# Patient Record
Sex: FEMALE | URBAN - METROPOLITAN AREA
[De-identification: names, ages, dates, MRNs, and addresses within clinical notes are randomized per-mention and may not be internally consistent; named-entity substitution may affect disease eponyms.]

---

## 2024-08-08 ENCOUNTER — OFFICE VISIT (OUTPATIENT)
Dept: FAMILY MEDICINE CLINIC | Age: 39
End: 2024-08-08
Payer: COMMERCIAL

## 2024-08-08 VITALS
HEIGHT: 66 IN | WEIGHT: 244.2 LBS | DIASTOLIC BLOOD PRESSURE: 80 MMHG | SYSTOLIC BLOOD PRESSURE: 120 MMHG | TEMPERATURE: 97.6 F | BODY MASS INDEX: 39.25 KG/M2 | OXYGEN SATURATION: 97 % | HEART RATE: 76 BPM | RESPIRATION RATE: 16 BRPM

## 2024-08-08 DIAGNOSIS — Z00.00 ENCOUNTER FOR WELL ADULT EXAM WITHOUT ABNORMAL FINDINGS: Primary | ICD-10-CM

## 2024-08-08 DIAGNOSIS — E61.1 LOW SERUM IRON: ICD-10-CM

## 2024-08-08 DIAGNOSIS — E55.9 VITAMIN D DEFICIENCY: ICD-10-CM

## 2024-08-08 DIAGNOSIS — E78.00 ELEVATED LDL CHOLESTEROL LEVEL: ICD-10-CM

## 2024-08-08 PROCEDURE — 99395 PREV VISIT EST AGE 18-39: CPT | Performed by: NURSE PRACTITIONER

## 2024-08-08 RX ORDER — METHYLDOPA 500 MG
160 TABLET ORAL 2 TIMES DAILY
Qty: 90 TABLET | Refills: 2 | Status: SHIPPED | OUTPATIENT
Start: 2024-08-08

## 2024-08-08 ASSESSMENT — ANXIETY QUESTIONNAIRES
IF YOU CHECKED OFF ANY PROBLEMS ON THIS QUESTIONNAIRE, HOW DIFFICULT HAVE THESE PROBLEMS MADE IT FOR YOU TO DO YOUR WORK, TAKE CARE OF THINGS AT HOME, OR GET ALONG WITH OTHER PEOPLE: NOT DIFFICULT AT ALL
7. FEELING AFRAID AS IF SOMETHING AWFUL MIGHT HAPPEN: NOT AT ALL
GAD7 TOTAL SCORE: 3
5. BEING SO RESTLESS THAT IT IS HARD TO SIT STILL: NOT AT ALL
6. BECOMING EASILY ANNOYED OR IRRITABLE: SEVERAL DAYS
4. TROUBLE RELAXING: NOT AT ALL
2. NOT BEING ABLE TO STOP OR CONTROL WORRYING: SEVERAL DAYS
3. WORRYING TOO MUCH ABOUT DIFFERENT THINGS: NOT AT ALL
1. FEELING NERVOUS, ANXIOUS, OR ON EDGE: SEVERAL DAYS

## 2024-08-08 ASSESSMENT — PATIENT HEALTH QUESTIONNAIRE - PHQ9
SUM OF ALL RESPONSES TO PHQ QUESTIONS 1-9: 1
SUM OF ALL RESPONSES TO PHQ QUESTIONS 1-9: 1
SUM OF ALL RESPONSES TO PHQ9 QUESTIONS 1 & 2: 1
SUM OF ALL RESPONSES TO PHQ QUESTIONS 1-9: 1
SUM OF ALL RESPONSES TO PHQ QUESTIONS 1-9: 1
1. LITTLE INTEREST OR PLEASURE IN DOING THINGS: SEVERAL DAYS
2. FEELING DOWN, DEPRESSED OR HOPELESS: NOT AT ALL

## 2024-08-08 ASSESSMENT — ENCOUNTER SYMPTOMS
CONSTIPATION: 0
RESPIRATORY NEGATIVE: 1
DIARRHEA: 0
GASTROINTESTINAL NEGATIVE: 1
VOMITING: 0
SHORTNESS OF BREATH: 0
ABDOMINAL PAIN: 0
NAUSEA: 0
EYES NEGATIVE: 1
COUGH: 0

## 2024-08-08 NOTE — PATIENT INSTRUCTIONS
Thank you for choosing [facility].  We know you have options when it comes to your healthcare; we appreciate that you chose us. Our goal is to provide exceptional  service and world class care to every patient.  You will be receiving a survey via email or text message asking for your feedback.  Please take a few minutes to share your thoughts about your recent visit. Your comments helps us understand what we do well and ways we can improve.  Thank you in advance for your valuable feedback.              New Updates for [facility]    Thank you for choosing [facility] to give you the best care! [facility] is always trying to think of new ways to help their patients. We are asking all patients to try out the new digital registration that is now available through the [facility]. Down load today!. Via the willy you're now able to update your personal and registration information prior to your upcoming appointment. This will save you time once you arrive at the office to check-in, not to mention your information remains safe!! Many other perks come from signing up for an account, such as:  Requesting refills  Scheduling an appointment  Completing an E-Visit  Sending a message to the office/provider  Having access to your medication list  Paying your bill/copay prior to your appointment  Scheduling your yearly mammogram  Review your test results    If you are not familiar the [facility], please ask one of us and we will be happy to answer any questions or help you set-up your account.             Well Visit, Ages 18 to 65: Care Instructions  Well visits can help you stay healthy. Your doctor has checked your overall health and may have suggested ways to take good care of yourself. Your doctor also may have recommended tests. You can help prevent illness with healthy eating, good sleep, vaccinations, regular exercise, and other steps.    Get the tests that you and your doctor

## 2024-08-08 NOTE — PROGRESS NOTES
by mouth daily    ferrous sulfate dried (SLOW RELEASE IRON) 160 (50 Fe) MG TBCR extended release tablet 90 tablet 2     Sig: Take 160 mg by mouth 2 times daily       All patient questions answered.  Patient voiced understanding.    Quality Measures    Body mass index is 39.78 kg/m². Elevated. Weight control planned discussed Healthy diet and regular exercise.    BP: 120/80 Blood pressure is normal. Treatment plan consists of No treatment change needed.    No results found for: \"LDLDIRECT\" (goal LDL reduction with dx if diabetes is 50% LDL reduction)          8/8/2024     8:43 AM 7/15/2024     8:53 AM   PHQ Scores   PHQ2 Score 1 0   PHQ9 Score 1 4     Interpretation of Total Score Depression Severity: 1-4 = Minimal depression, 5-9 = Mild depression, 10-14 = Moderate depression, 15-19 = Moderately severe depression, 20-27 = Severe depression     Return in about 6 months (around 2/8/2025) for Anxiety.    (Please note that portions of this note were completed with a voice recognition program. Efforts were made to edit the dictations but occasionally words are mis-transcribed.)      Electronically signed by PAT Hylton CNP on 8/8/24 at 8:43 AM [facility]UST[facility]

## 2025-02-05 SDOH — ECONOMIC STABILITY: FOOD INSECURITY: WITHIN THE PAST 12 MONTHS, YOU WORRIED THAT YOUR FOOD WOULD RUN OUT BEFORE YOU GOT MONEY TO BUY MORE.: NEVER TRUE

## 2025-02-05 SDOH — ECONOMIC STABILITY: INCOME INSECURITY: IN THE LAST 12 MONTHS, WAS THERE A TIME WHEN YOU WERE NOT ABLE TO PAY THE MORTGAGE OR RENT ON TIME?: NO

## 2025-02-05 SDOH — ECONOMIC STABILITY: TRANSPORTATION INSECURITY
IN THE PAST 12 MONTHS, HAS THE LACK OF TRANSPORTATION KEPT YOU FROM MEDICAL APPOINTMENTS OR FROM GETTING MEDICATIONS?: NO

## 2025-02-05 SDOH — ECONOMIC STABILITY: FOOD INSECURITY: WITHIN THE PAST 12 MONTHS, THE FOOD YOU BOUGHT JUST DIDN'T LAST AND YOU DIDN'T HAVE MONEY TO GET MORE.: NEVER TRUE

## 2025-02-05 SDOH — ECONOMIC STABILITY: TRANSPORTATION INSECURITY
IN THE PAST 12 MONTHS, HAS LACK OF TRANSPORTATION KEPT YOU FROM MEETINGS, WORK, OR FROM GETTING THINGS NEEDED FOR DAILY LIVING?: NO

## 2025-02-06 ENCOUNTER — TELEMEDICINE (OUTPATIENT)
Dept: FAMILY MEDICINE CLINIC | Age: 40
End: 2025-02-06
Payer: COMMERCIAL

## 2025-02-06 DIAGNOSIS — Z13.228 SCREENING FOR ENDOCRINE, NUTRITIONAL, METABOLIC AND IMMUNITY DISORDER: ICD-10-CM

## 2025-02-06 DIAGNOSIS — E61.1 LOW SERUM IRON: ICD-10-CM

## 2025-02-06 DIAGNOSIS — Z13.29 SCREENING FOR ENDOCRINE, NUTRITIONAL, METABOLIC AND IMMUNITY DISORDER: ICD-10-CM

## 2025-02-06 DIAGNOSIS — Z13.21 SCREENING FOR ENDOCRINE, NUTRITIONAL, METABOLIC AND IMMUNITY DISORDER: ICD-10-CM

## 2025-02-06 DIAGNOSIS — Z13.220 LIPID SCREENING: ICD-10-CM

## 2025-02-06 DIAGNOSIS — R63.5 UNINTENDED WEIGHT GAIN: ICD-10-CM

## 2025-02-06 DIAGNOSIS — R53.83 OTHER FATIGUE: Primary | ICD-10-CM

## 2025-02-06 DIAGNOSIS — E55.9 VITAMIN D DEFICIENCY: ICD-10-CM

## 2025-02-06 DIAGNOSIS — Z83.3 FAMILY HISTORY OF DIABETES MELLITUS: ICD-10-CM

## 2025-02-06 DIAGNOSIS — Z13.0 SCREENING FOR ENDOCRINE, NUTRITIONAL, METABOLIC AND IMMUNITY DISORDER: ICD-10-CM

## 2025-02-06 PROCEDURE — 99213 OFFICE O/P EST LOW 20 MIN: CPT | Performed by: NURSE PRACTITIONER

## 2025-02-06 PROCEDURE — G8427 DOCREV CUR MEDS BY ELIG CLIN: HCPCS | Performed by: NURSE PRACTITIONER

## 2025-02-06 RX ORDER — METHYLDOPA 500 MG
160 TABLET ORAL 2 TIMES DAILY
Qty: 90 TABLET | Refills: 2 | Status: SHIPPED | OUTPATIENT
Start: 2025-02-06

## 2025-02-06 ASSESSMENT — ENCOUNTER SYMPTOMS
CONSTIPATION: 0
EYES NEGATIVE: 1
RESPIRATORY NEGATIVE: 1
GASTROINTESTINAL NEGATIVE: 1
ABDOMINAL PAIN: 0
DIARRHEA: 0
VOMITING: 0
NAUSEA: 0
SHORTNESS OF BREATH: 0
COUGH: 0

## 2025-02-06 ASSESSMENT — PATIENT HEALTH QUESTIONNAIRE - PHQ9
2. FEELING DOWN, DEPRESSED OR HOPELESS: NOT AT ALL
SUM OF ALL RESPONSES TO PHQ QUESTIONS 1-9: 0
1. LITTLE INTEREST OR PLEASURE IN DOING THINGS: NOT AT ALL
SUM OF ALL RESPONSES TO PHQ9 QUESTIONS 1 & 2: 0
SUM OF ALL RESPONSES TO PHQ QUESTIONS 1-9: 0

## 2025-02-06 NOTE — PATIENT INSTRUCTIONS
Thank you for choosing [facility].  We know you have options when it comes to your healthcare; we appreciate that you chose us. Our goal is to provide exceptional  service and world class care to every patient.  You will be receiving a survey via email or text message asking for your feedback.  Please take a few minutes to share your thoughts about your recent visit. Your comments helps us understand what we do well and ways we can improve.  Thank you in advance for your valuable feedback.            New Updates for [facility]    Thank you for choosing [facility] to give you the best care! [facility] is always trying to think of new ways to help their patients. We are asking all patients to try out the new digital registration that is now available through the [facility]. Down load today!. Via the willy you're now able to update your personal and registration information prior to your upcoming appointment. This will save you time once you arrive at the office to check-in, not to mention your information remains safe!! Many other perks come from signing up for an account, such as:  Requesting refills  Scheduling an appointment  Completing an E-Visit  Sending a message to the office/provider  Having access to your medication list  Paying your bill/copay prior to your appointment  Scheduling your yearly mammogram  Review your test results    If you are not familiar the [facility], please ask one of us and we will be happy to answer any questions or help you set-up your account.

## 2025-02-06 NOTE — PROGRESS NOTES
[facility]  [facility] MEDICINE  [address]  [address]  [place] [address]  Dept: [phone]    2025    TELEHEALTH EVALUATION -- Audio/Visual   Fareedpedro luis Alvarez (:  1985) has requested an audio/video evaluation for the following concern(s):    HPI:  Appointment for anxiety follow-up.    Specialists:      OBGYN- Denisse Martinez CNP., needs to reschedule apt to establish   Endocrinology- Referral for Dr. De Luna, but nothing scheduled.     Anxiety- Doing well without medication or counselor, but she is considering starting to go to therapy again. Going to the gym a few times per week, setting boundaries and self care in general.    Iron deficiency- she hasn't been taking iron. She is going to get it started again.     Vitamin D deficiency- Taking Vitamin D as prescribed. Overall she does feel better with the vitamin D.    Fatigue/Weight gain. Referral provided to endocrinology in July at NP appointment, but she never heard back from their office.        There were no vitals filed for this visit.        2025     9:34 AM   Patient-Reported Vitals   Patient-Reported Weight 239   Patient-Reported Height 5'4   Patient-Reported Temperature 98.2         Wt Readings from Last 3 Encounters:   24 110.8 kg (244 lb 3.2 oz)   07/15/24 110.5 kg (243 lb 9.6 oz)     BP Readings from Last 3 Encounters:   24 120/80   07/15/24 120/82       REVIEW OF SYSTEMS:   Review of Systems   Constitutional:  Positive for activity change, fatigue and unexpected weight change. Negative for chills and fever.   Eyes: Negative.  Negative for visual disturbance.   Respiratory: Negative.  Negative for cough and shortness of breath.    Cardiovascular: Negative.  Negative for chest pain and palpitations.   Gastrointestinal: Negative.  Negative for abdominal pain, constipation, diarrhea, nausea and vomiting.   Neurological: Negative.  Negative for dizziness and headaches.

## 2025-02-21 ENCOUNTER — PATIENT MESSAGE (OUTPATIENT)
Dept: FAMILY MEDICINE CLINIC | Age: 40
End: 2025-02-21

## 2025-05-02 NOTE — PROGRESS NOTES
[facility]  [facility]  [address]  [address]  [place] [address]  Dept: [phone]      25    Francisco Alvarez       Gyn Annual Exam      CHIEF COMPLAINT:    Chief Complaint   Patient presents with    [facility] Care    Annual Exam     Last pap 19 WNL HPV-                 Blood pressure 137/83, height 1.664 m (5' 5.5\"), weight 112.7 kg (248 lb 6.4 oz), last menstrual period 2025.     HPI :   Francisco Alvarez 1985 is a 39 y.o.   who is here for her annual exam.   Last pap 2019 NILM neg hpv  Decreased libido, decline in memory and is asking about perimenopause    Periods are monthly, occurring every 22-24 days and lasting 5 days.   HMB- no  Dysmenorrhea- occasionally    Moved here from [place].  Kids   does national certification for teachers  _____________________________________________________________________  Past Medical History:   Diagnosis Date    Abnormal Pap smear of cervix     unknown details, LEEP was WNL    Anxiety                                                                    Past Surgical History:   Procedure Laterality Date    CERVICAL BIOPSY  W/ LOOP ELECTRODE EXCISION      abnormal pap, testing came back benign     Family History   Problem Relation Age of Onset    Hypertension Mother     No Known Problems Father         unknown health hx    Anxiety Disorder Sister     Arthritis Maternal Grandmother     Hypertension Maternal Grandmother     Gout Maternal Grandmother     Glaucoma Maternal Grandmother     Kidney Disease Maternal Grandmother         no HD at this time    No Known Problems Maternal Grandfather     No Known Problems Paternal Grandmother     Diabetes Paternal Grandfather     Hypertension Paternal Grandfather     Prostate Cancer Paternal Grandfather     Prostate Cancer Maternal Great Grandfather     Breast Cancer Maternal Aunt         40's maternal great aunt

## 2025-05-05 ENCOUNTER — HOSPITAL ENCOUNTER (OUTPATIENT)
Age: 40
Setting detail: SPECIMEN
Discharge: HOME | End: 2025-05-05

## 2025-05-05 ENCOUNTER — OFFICE VISIT (OUTPATIENT)
Dept: OBGYN CLINIC | Age: 40
End: 2025-05-05
Payer: COMMERCIAL

## 2025-05-05 VITALS
HEIGHT: 66 IN | BODY MASS INDEX: 39.92 KG/M2 | DIASTOLIC BLOOD PRESSURE: 83 MMHG | WEIGHT: 248.4 LBS | SYSTOLIC BLOOD PRESSURE: 137 MMHG

## 2025-05-05 DIAGNOSIS — Z01.419 ENCOUNTER FOR GYNECOLOGICAL EXAMINATION: Primary | ICD-10-CM

## 2025-05-05 DIAGNOSIS — Z12.31 ENCOUNTER FOR SCREENING MAMMOGRAM FOR BREAST CANCER: ICD-10-CM

## 2025-05-05 PROCEDURE — 99385 PREV VISIT NEW AGE 18-39: CPT | Performed by: NURSE PRACTITIONER

## 2025-05-05 ASSESSMENT — ENCOUNTER SYMPTOMS
SHORTNESS OF BREATH: 0
BACK PAIN: 0
COUGH: 0
ABDOMINAL DISTENTION: 0
RESPIRATORY NEGATIVE: 1
GASTROINTESTINAL NEGATIVE: 1
ALLERGIC/IMMUNOLOGIC NEGATIVE: 1

## 2025-05-06 ENCOUNTER — RESULTS FOLLOW-UP (OUTPATIENT)
Dept: OBGYN CLINIC | Age: 40
End: 2025-05-06

## 2025-05-06 LAB
HPV I/H RISK 4 DNA CVX QL NAA+PROBE: NOT DETECTED
HPV SAMPLE: NORMAL
HPV, INTERPRETATION: NORMAL
HPV16 DNA CVX QL NAA+PROBE: NOT DETECTED
HPV18 DNA CVX QL NAA+PROBE: NOT DETECTED
SPECIMEN DESCRIPTION: NORMAL

## 2025-05-15 LAB — CYTOLOGY REPORT: NORMAL

## 2025-08-15 ENCOUNTER — OFFICE VISIT (OUTPATIENT)
Dept: FAMILY MEDICINE CLINIC | Age: 40
End: 2025-08-15
Payer: COMMERCIAL

## 2025-08-15 VITALS
HEIGHT: 66 IN | HEART RATE: 82 BPM | DIASTOLIC BLOOD PRESSURE: 82 MMHG | RESPIRATION RATE: 15 BRPM | TEMPERATURE: 97.8 F | SYSTOLIC BLOOD PRESSURE: 134 MMHG | BODY MASS INDEX: 39.57 KG/M2 | OXYGEN SATURATION: 99 % | WEIGHT: 246.2 LBS

## 2025-08-15 DIAGNOSIS — K59.09 OTHER CONSTIPATION: ICD-10-CM

## 2025-08-15 DIAGNOSIS — R53.83 OTHER FATIGUE: ICD-10-CM

## 2025-08-15 DIAGNOSIS — F41.9 ANXIETY: ICD-10-CM

## 2025-08-15 DIAGNOSIS — Z83.3 FAMILY HISTORY OF DIABETES MELLITUS: ICD-10-CM

## 2025-08-15 DIAGNOSIS — Z00.00 ENCOUNTER FOR WELL ADULT EXAM WITHOUT ABNORMAL FINDINGS: Primary | ICD-10-CM

## 2025-08-15 DIAGNOSIS — R63.5 UNINTENDED WEIGHT GAIN: ICD-10-CM

## 2025-08-15 PROCEDURE — G8417 CALC BMI ABV UP PARAM F/U: HCPCS | Performed by: NURSE PRACTITIONER

## 2025-08-15 PROCEDURE — 99213 OFFICE O/P EST LOW 20 MIN: CPT | Performed by: NURSE PRACTITIONER

## 2025-08-15 PROCEDURE — 1036F TOBACCO NON-USER: CPT | Performed by: NURSE PRACTITIONER

## 2025-08-15 PROCEDURE — G8427 DOCREV CUR MEDS BY ELIG CLIN: HCPCS | Performed by: NURSE PRACTITIONER

## 2025-08-15 PROCEDURE — 99395 PREV VISIT EST AGE 18-39: CPT | Performed by: NURSE PRACTITIONER

## 2025-08-15 ASSESSMENT — PATIENT HEALTH QUESTIONNAIRE - PHQ9
2. FEELING DOWN, DEPRESSED OR HOPELESS: NOT AT ALL
SUM OF ALL RESPONSES TO PHQ QUESTIONS 1-9: 5
6. FEELING BAD ABOUT YOURSELF - OR THAT YOU ARE A FAILURE OR HAVE LET YOURSELF OR YOUR FAMILY DOWN: NOT AT ALL
3. TROUBLE FALLING OR STAYING ASLEEP: SEVERAL DAYS
9. THOUGHTS THAT YOU WOULD BE BETTER OFF DEAD, OR OF HURTING YOURSELF: NOT AT ALL
SUM OF ALL RESPONSES TO PHQ QUESTIONS 1-9: 5
5. POOR APPETITE OR OVEREATING: MORE THAN HALF THE DAYS
1. LITTLE INTEREST OR PLEASURE IN DOING THINGS: SEVERAL DAYS
10. IF YOU CHECKED OFF ANY PROBLEMS, HOW DIFFICULT HAVE THESE PROBLEMS MADE IT FOR YOU TO DO YOUR WORK, TAKE CARE OF THINGS AT HOME, OR GET ALONG WITH OTHER PEOPLE: NOT DIFFICULT AT ALL
8. MOVING OR SPEAKING SO SLOWLY THAT OTHER PEOPLE COULD HAVE NOTICED. OR THE OPPOSITE, BEING SO FIGETY OR RESTLESS THAT YOU HAVE BEEN MOVING AROUND A LOT MORE THAN USUAL: NOT AT ALL
7. TROUBLE CONCENTRATING ON THINGS, SUCH AS READING THE NEWSPAPER OR WATCHING TELEVISION: NOT AT ALL
4. FEELING TIRED OR HAVING LITTLE ENERGY: SEVERAL DAYS